# Patient Record
Sex: MALE | Race: WHITE
[De-identification: names, ages, dates, MRNs, and addresses within clinical notes are randomized per-mention and may not be internally consistent; named-entity substitution may affect disease eponyms.]

---

## 2017-05-16 ENCOUNTER — HOSPITAL ENCOUNTER (OUTPATIENT)
Dept: HOSPITAL 53 - M OPP | Age: 62
Discharge: HOME | End: 2017-05-16
Attending: INTERNAL MEDICINE
Payer: MEDICARE

## 2017-05-16 VITALS — BODY MASS INDEX: 32.08 KG/M2 | HEIGHT: 74 IN | WEIGHT: 250 LBS

## 2017-05-16 VITALS — DIASTOLIC BLOOD PRESSURE: 78 MMHG | SYSTOLIC BLOOD PRESSURE: 130 MMHG

## 2017-05-16 DIAGNOSIS — K57.30: ICD-10-CM

## 2017-05-16 DIAGNOSIS — K57.92: ICD-10-CM

## 2017-05-16 DIAGNOSIS — E66.9: ICD-10-CM

## 2017-05-16 DIAGNOSIS — K21.9: ICD-10-CM

## 2017-05-16 DIAGNOSIS — M54.2: ICD-10-CM

## 2017-05-16 DIAGNOSIS — K44.9: ICD-10-CM

## 2017-05-16 DIAGNOSIS — M51.9: ICD-10-CM

## 2017-05-16 DIAGNOSIS — K64.0: ICD-10-CM

## 2017-05-16 DIAGNOSIS — Z86.010: ICD-10-CM

## 2017-05-16 DIAGNOSIS — Z79.899: ICD-10-CM

## 2017-05-16 DIAGNOSIS — M25.60: ICD-10-CM

## 2017-05-16 DIAGNOSIS — K62.5: ICD-10-CM

## 2017-05-16 DIAGNOSIS — I10: ICD-10-CM

## 2017-05-16 DIAGNOSIS — K22.70: ICD-10-CM

## 2017-05-16 DIAGNOSIS — Z80.51: ICD-10-CM

## 2017-05-16 DIAGNOSIS — M54.32: ICD-10-CM

## 2017-05-16 DIAGNOSIS — R10.32: Primary | ICD-10-CM

## 2017-05-16 DIAGNOSIS — R06.83: ICD-10-CM

## 2017-05-16 DIAGNOSIS — R12: ICD-10-CM

## 2017-06-29 ENCOUNTER — HOSPITAL ENCOUNTER (EMERGENCY)
Dept: HOSPITAL 53 - M ED | Age: 62
Discharge: HOME | End: 2017-06-29
Payer: MEDICARE

## 2017-06-29 VITALS — WEIGHT: 251.33 LBS | HEIGHT: 74 IN | BODY MASS INDEX: 32.25 KG/M2

## 2017-06-29 VITALS — DIASTOLIC BLOOD PRESSURE: 81 MMHG | SYSTOLIC BLOOD PRESSURE: 169 MMHG

## 2017-06-29 DIAGNOSIS — K21.9: ICD-10-CM

## 2017-06-29 DIAGNOSIS — G89.29: ICD-10-CM

## 2017-06-29 DIAGNOSIS — N50.3: Primary | ICD-10-CM

## 2017-06-29 DIAGNOSIS — N50.811: ICD-10-CM

## 2017-06-29 DIAGNOSIS — Z79.899: ICD-10-CM

## 2017-06-29 DIAGNOSIS — I10: ICD-10-CM

## 2017-06-30 NOTE — REPUSA
Clinical history: Pain.

Findings: Real-time ultrasound imaging of the testicles and scrotum was performed. The right testicle
 measures  4.3 x 2.9 x 3.2 cm. The left testicle measures 4.3 x 2.6 x 3.3 cm. The testicles demonstra
te normal echo texture and echogenicity. Detailed the right epididymis is thickened. There is a small
 cyst in the right epididymis measuring 5 mm. Normal color Doppler flow and arterial waveforms are se
en bilaterally. No fluid collections are seen.

Impression:

1. Unremarkable ultrasound examination of the testicles.

2. Right epididymal cyst.

3. Slight thickening of the tail of the right epididymis. This is a nonspecific finding, but early ep
ididymitis cannot be excluded. Follow-up is suggested as clinically indicated.

     Electronically signed by CLEVE FLORIAN MD on 06/29/2017 09:28:27 PM ET

## 2017-07-21 ENCOUNTER — HOSPITAL ENCOUNTER (OUTPATIENT)
Dept: HOSPITAL 53 - M SMT | Age: 62
End: 2017-07-21
Attending: UROLOGY
Payer: MEDICARE

## 2017-07-21 DIAGNOSIS — N50.819: Primary | ICD-10-CM

## 2017-07-21 DIAGNOSIS — Z12.5: ICD-10-CM

## 2017-07-21 LAB
ANION GAP SERPL CALC-SCNC: 9 MEQ/L (ref 8–16)
BUN SERPL-MCNC: 16 MG/DL (ref 7–18)
CALCIUM SERPL-MCNC: 9.3 MG/DL (ref 8.8–10.2)
CHLORIDE SERPL-SCNC: 102 MEQ/L (ref 98–107)
CO2 SERPL-SCNC: 26 MEQ/L (ref 21–32)
CREAT SERPL-MCNC: 1.08 MG/DL (ref 0.7–1.3)
ERYTHROCYTE [DISTWIDTH] IN BLOOD BY AUTOMATED COUNT: 12.1 % (ref 11.5–14.5)
GFR SERPL CREATININE-BSD FRML MDRD: > 60 ML/MIN/{1.73_M2} (ref 49–?)
GLUCOSE SERPL-MCNC: 88 MG/DL (ref 80–110)
MCH RBC QN AUTO: 32.9 PG (ref 27–33)
MCHC RBC AUTO-ENTMCNC: 34.6 G/DL (ref 32–36.5)
MCV RBC AUTO: 95.2 FL (ref 80–96)
PLATELET # BLD AUTO: 223 K/MM3 (ref 150–450)
POTASSIUM SERPL-SCNC: 4.8 MEQ/L (ref 3.5–5.1)
SODIUM SERPL-SCNC: 137 MEQ/L (ref 136–145)
WBC # BLD AUTO: 3.7 K/MM3 (ref 4–10)

## 2017-07-21 PROCEDURE — 85027 COMPLETE CBC AUTOMATED: CPT

## 2017-07-21 PROCEDURE — 80048 BASIC METABOLIC PNL TOTAL CA: CPT

## 2017-07-21 PROCEDURE — 84702 CHORIONIC GONADOTROPIN TEST: CPT

## 2017-07-21 PROCEDURE — 84154 ASSAY OF PSA FREE: CPT

## 2017-07-21 PROCEDURE — 82105 ALPHA-FETOPROTEIN SERUM: CPT

## 2017-07-21 PROCEDURE — 81001 URINALYSIS AUTO W/SCOPE: CPT

## 2017-07-21 PROCEDURE — 36415 COLL VENOUS BLD VENIPUNCTURE: CPT

## 2017-07-21 PROCEDURE — 87086 URINE CULTURE/COLONY COUNT: CPT

## 2017-07-21 PROCEDURE — 83615 LACTATE (LD) (LDH) ENZYME: CPT

## 2017-07-23 LAB
HCG INTACT+B SERPL-ACNC: < 1 MIU/ML (ref 0–3)
PSA SERPL-MCNC: 2.2 NG/ML (ref 0–4)

## 2017-08-07 ENCOUNTER — HOSPITAL ENCOUNTER (OUTPATIENT)
Dept: HOSPITAL 53 - M SMT | Age: 62
End: 2017-08-07
Attending: UROLOGY
Payer: MEDICARE

## 2017-08-07 DIAGNOSIS — Z01.818: ICD-10-CM

## 2017-08-07 DIAGNOSIS — N50.819: Primary | ICD-10-CM

## 2017-08-07 LAB — INR PPP: 0.9

## 2017-08-07 NOTE — REP
PA and lateral chest:

 

Comparison is 02/16/2007.

 

The lung fields are clear.  The cardiac size is normal

 

The ebony, mediastinum, and bony thorax are unremarkable.

 

Impression:

 

Negative PA and lateral chest.  There is no interval change.

 

 

Signed by

Juan Carlos Lord MD 08/07/2017 08:47 A

## 2017-08-23 ENCOUNTER — HOSPITAL ENCOUNTER (OUTPATIENT)
Dept: HOSPITAL 53 - M SDC | Age: 62
Discharge: HOME | End: 2017-08-23
Attending: UROLOGY
Payer: MEDICARE

## 2017-08-23 VITALS — SYSTOLIC BLOOD PRESSURE: 146 MMHG | DIASTOLIC BLOOD PRESSURE: 77 MMHG

## 2017-08-23 VITALS — HEIGHT: 74 IN | WEIGHT: 245 LBS | BODY MASS INDEX: 31.44 KG/M2

## 2017-08-23 DIAGNOSIS — Z79.899: ICD-10-CM

## 2017-08-23 DIAGNOSIS — K21.9: ICD-10-CM

## 2017-08-23 DIAGNOSIS — K57.32: ICD-10-CM

## 2017-08-23 DIAGNOSIS — K22.70: ICD-10-CM

## 2017-08-23 DIAGNOSIS — N50.3: Primary | ICD-10-CM

## 2017-08-23 DIAGNOSIS — I10: ICD-10-CM

## 2017-08-23 PROCEDURE — 86900 BLOOD TYPING SEROLOGIC ABO: CPT

## 2017-08-23 PROCEDURE — 86901 BLOOD TYPING SEROLOGIC RH(D): CPT

## 2017-08-23 PROCEDURE — 86850 RBC ANTIBODY SCREEN: CPT

## 2017-08-23 PROCEDURE — 54860 REMOVAL OF EPIDIDYMIS: CPT

## 2017-08-23 PROCEDURE — 36415 COLL VENOUS BLD VENIPUNCTURE: CPT

## 2017-08-23 PROCEDURE — 88304 TISSUE EXAM BY PATHOLOGIST: CPT

## 2017-08-24 NOTE — RO
DATE OF PROCEDURE:  08/23/2017

 

PREPROCEDURE DIAGNOSIS: Right epididymal tumor.

 

POSTPROCEDURE DIAGNOSIS:  Right epididymal tumor.

 

FINDINGS: Right epididymal tumor about 3 cm in diameter in the head of the

epididymis.

 

PROCEDURE:  Right scrotal exploration plus right epididymectomy.

 

SURGEON:  Dr. Devon Tam

 

ASSISTANT:  None.

 

ANESTHESIA:  General.

 

COMPLICATIONS:  None.

 

ESTIMATED BLOOD LOSS:  N/A.

 

HISTORY OF PRESENT ILLNESS:  This is a 61-year-old male patient that has a solid

tumor in the head of the epididymis. On ultrasound it shows to be a complex cyst.

Tumor markers are negative. For this reason, the patient went for a trial of

antibiotics which actually did not diminish the pain nor the size of the tumor.

For this reason he has consented for a right scrotal exploration plus right

epididymectomy.

 

DESCRIPTION OF PROCEDURE:  In a patient in supine position under general

anesthesia after prepping and draping the area of concern, which included the

entire genitalia and abdomen, we started by doing an incision in the right

hemiscrotal area for about 4 cm in length, transverse. Through this incision with

electro Bovie cautery, we opened the tunica vaginalis in a longitudinal fashion

and everted the tunica vaginalis completely and secured hemostasis. We then

proceeded to actively dissect the epididymis and did a formal epididymectomy. We

secured hemostasis with electro Bovie cautery and tied the vas deferens with #3-0

chromic times two. We then proceeded to actively send specimen as epididymectomy

and epididymal tumor for permanent pathology analysis right epididymectomy. We

then secured hemostasis and dropped the testicle inside the scrotal sac after

confirming that it had great flow with vascular Doppler which actually proved

that he had good arterial flow and venous flow. We dropped the testicle inside

the scrotal sac and closed the scrotum in two layers with a running fashion #3-0

chromic and skin in a running fashion also with #3-0 chromic. We placed

bacitracin cream, fluffs and scrotal support. There were no complications.

Patient will go home today with antibiotics and pain medication. Followup at

[facility] in about 1-2 weeks.

## 2020-07-03 ENCOUNTER — HOSPITAL ENCOUNTER (OUTPATIENT)
Dept: HOSPITAL 53 - M LAB REF | Age: 65
End: 2020-07-03
Attending: PHYSICIAN ASSISTANT
Payer: MEDICARE

## 2020-07-03 DIAGNOSIS — Z03.818: Primary | ICD-10-CM

## 2020-07-03 DIAGNOSIS — Z11.59: ICD-10-CM

## 2020-08-10 ENCOUNTER — HOSPITAL ENCOUNTER (OUTPATIENT)
Dept: HOSPITAL 53 - M LAB REF | Age: 65
End: 2020-08-10
Attending: PHYSICIAN ASSISTANT
Payer: MEDICARE

## 2020-08-10 DIAGNOSIS — Z11.59: ICD-10-CM

## 2020-08-10 DIAGNOSIS — Z03.818: Primary | ICD-10-CM

## 2021-11-29 ENCOUNTER — HOSPITAL ENCOUNTER (OUTPATIENT)
Dept: HOSPITAL 53 - M LAB REF | Age: 66
End: 2021-11-29
Attending: INTERNAL MEDICINE
Payer: MEDICARE

## 2021-11-29 DIAGNOSIS — Z83.71: Primary | ICD-10-CM

## 2022-07-22 ENCOUNTER — HOSPITAL ENCOUNTER (OUTPATIENT)
Dept: HOSPITAL 53 - M LAB REF | Age: 67
End: 2022-07-22
Attending: PHYSICIAN ASSISTANT
Payer: MEDICARE

## 2022-07-22 DIAGNOSIS — M79.10: ICD-10-CM

## 2022-07-22 DIAGNOSIS — N39.0: Primary | ICD-10-CM

## 2022-07-22 LAB
APPEARANCE UR: CLEAR
BACTERIA UR QL AUTO: NEGATIVE
BILIRUB UR QL STRIP.AUTO: NEGATIVE
GLUCOSE UR QL STRIP.AUTO: NEGATIVE MG/DL
HGB UR QL STRIP.AUTO: (no result)
KETONES UR QL STRIP.AUTO: NEGATIVE MG/DL
LEUKOCYTE ESTERASE UR QL STRIP.AUTO: (no result)
NITRITE UR QL STRIP.AUTO: NEGATIVE
PH UR STRIP.AUTO: 6 UNITS (ref 5–9)
PROT UR QL STRIP.AUTO: NEGATIVE MG/DL
RBC # UR AUTO: 1 /HPF (ref 0–3)
SP GR UR STRIP.AUTO: 1.01 (ref 1–1.03)
SQUAMOUS #/AREA URNS AUTO: 0 /HPF (ref 0–6)
UROBILINOGEN UR QL STRIP.AUTO: 0.2 MG/DL (ref 0–2)
WBC #/AREA URNS AUTO: 4 /HPF (ref 0–3)

## 2023-02-24 NOTE — ROOR
________________________________________________________________________________

Patient Name: Lucas Conner            Procedure Date: 5/16/2017 7:59 AM

MRN: [id number]                          Account Number: [id number]

YOB: 1955              Age: 61

Room: [facility]                            Gender: Male

Note Status: Finalized                 

________________________________________________________________________________

 

Procedure:           Upper GI endoscopy

Indications:         Heartburn

Providers:           Adin Soliman MD

Referring MD:        JANNETH LONG JR, MD

Requesting Provider: 

Medicines:           Monitored Anesthesia Care

Complications:       No immediate complications.

________________________________________________________________________________

Procedure:           Pre-Anesthesia Assessment:

                     - The heart rate, respiratory rate, oxygen saturations, 

                     blood pressure, adequacy of pulmonary ventilation, and 

                     response to care were monitored throughout the procedure.

                     The Endoscope was introduced through the mouth, and 

                     advanced to the second part of duodenum. The upper GI 

                     endoscopy was accomplished without difficulty. The 

                     patient tolerated the procedure well.

                                                                                

Findings:

     The Z-line was regular and was found 40 cm from the incisors.

     No other significant abnormalities were identified in a careful 

     examination of the stomach.

     The exam of the duodenum was otherwise normal.

                                                                                

Impression:          - Z-line regular, 40 cm from the incisors.

                     - No specimens collected.

                     - The examination was otherwise normal.

Recommendation:      - Patient has a contact number available for emergencies. 

                     The signs and symptoms of potential delayed complications 

                     were discussed with the patient. Return to normal 

                     activities tomorrow. Written discharge instructions were 

                     provided to the patient.

                     - High fiber diet.

                     - Discharge patient to home.

                     - Continue present medications.

                     - Return to referring physician.

                     - The findings and recommendations were discussed with 

                     the patient's family.

                                                                                

 

Adin Soliman MD

____________________

Adin Soliman MD

5/16/2017 8:14:04 AM

This report has been signed electronically.

Number of Addenda: 0

 

Note Initiated On: 5/16/2017 7:59 AM

Estimated Blood Loss:

     Estimated blood loss: none.
________________________________________________________________________________

Patient Name: Lucas Conner            Procedure Date: 5/16/2017 8:00 AM

MRN: [id number]                          Account Number: [id number]

YOB: 1955              Age: 61

Room: [facility]                            Gender: Male

Note Status: Finalized                 

________________________________________________________________________________

 

Procedure:           Total Colonoscopy to Cecum

Indications:         Last colonoscopy: 2009, Abdominal pain in the left lower 

                     quadrant, Rectal bleeding

Providers:           Adin Soliman MD

Referring MD:        JANNETH LONG JR, MD

Requesting Provider: 

Medicines:           Monitored Anesthesia Care

Complications:       No immediate complications.

________________________________________________________________________________

Procedure:           Pre-Anesthesia Assessment:

                     - The heart rate, respiratory rate, oxygen saturations, 

                     blood pressure, adequacy of pulmonary ventilation, and 

                     response to care were monitored throughout the procedure.

                     The Colonoscope was introduced through the anus and 

                     advanced to the cecum, identified by appendiceal orifice 

                     and ileocecal valve. The colonoscopy was performed 

                     without difficulty. The patient tolerated the procedure 

                     well. The quality of the bowel preparation was excellent.

                                                                                

Findings:

     The perianal and digital rectal examinations were normal.

     Non-bleeding internal hemorrhoids were found during retroflexion. The 

     hemorrhoids were small and Grade I (internal hemorrhoids that do not 

     prolapse).

     Multiple small and large-mouthed diverticula were found in the 

     recto-sigmoid colon, sigmoid colon and descending colon.

     The exam was otherwise without abnormality on direct and retroflexion 

     views.

                                                                                

Impression:          - Non-bleeding internal hemorrhoids.

                     - Diverticulosis in the recto-sigmoid colon, in the 

                     sigmoid colon and in the descending colon.

                     - The examination was otherwise normal on direct and 

                     retroflexion views.

                     - No specimens collected.

                     - The exam was otherwise normal to the cecum.

Recommendation:      - Patient has a contact number available for emergencies. 

                     The signs and symptoms of potential delayed complications 

                     were discussed with the patient. Return to normal 

                     activities tomorrow. Written discharge instructions were 

                     provided to the patient.

                     - High fiber diet.

                     - Discharge patient to home.

                     - Continue present medications.

                     - Repeat colonoscopy in 10 years for screening purposes.

                     - Return to referring physician.

                     - The findings and recommendations were discussed with 

                     the patient's family.

                                                                                

 

Adin Soliman MD

____________________

Adin Soliman MD

5/16/2017 8:28:04 AM

This report has been signed electronically.

Number of Addenda: 0

 

Note Initiated On: 5/16/2017 8:00 AM

Estimated Blood Loss:

     Estimated blood loss: none.
2

## 2023-03-31 ENCOUNTER — HOSPITAL ENCOUNTER (OUTPATIENT)
Dept: HOSPITAL 53 - M RAD | Age: 68
End: 2023-03-31
Payer: MEDICARE

## 2023-03-31 DIAGNOSIS — R10.30: Primary | ICD-10-CM

## 2023-03-31 PROCEDURE — 74177 CT ABD & PELVIS W/CONTRAST: CPT
